# Patient Record
Sex: MALE
[De-identification: names, ages, dates, MRNs, and addresses within clinical notes are randomized per-mention and may not be internally consistent; named-entity substitution may affect disease eponyms.]

---

## 2022-11-11 PROBLEM — Z00.00 ENCOUNTER FOR PREVENTIVE HEALTH EXAMINATION: Status: ACTIVE | Noted: 2022-11-11

## 2022-11-23 ENCOUNTER — APPOINTMENT (OUTPATIENT)
Dept: UROLOGY | Facility: CLINIC | Age: 32
End: 2022-11-23

## 2022-11-23 VITALS
WEIGHT: 180 LBS | HEIGHT: 67 IN | BODY MASS INDEX: 28.25 KG/M2 | TEMPERATURE: 99 F | SYSTOLIC BLOOD PRESSURE: 123 MMHG | DIASTOLIC BLOOD PRESSURE: 80 MMHG | OXYGEN SATURATION: 96 % | HEART RATE: 93 BPM | RESPIRATION RATE: 16 BRPM

## 2022-11-23 DIAGNOSIS — Z78.9 OTHER SPECIFIED HEALTH STATUS: ICD-10-CM

## 2022-11-23 DIAGNOSIS — F17.200 NICOTINE DEPENDENCE, UNSPECIFIED, UNCOMPLICATED: ICD-10-CM

## 2022-11-23 DIAGNOSIS — N46.9 MALE INFERTILITY, UNSPECIFIED: ICD-10-CM

## 2022-11-23 DIAGNOSIS — D75.A GLUCOSE-6-PHOSPHATE DEHYDROGENASE: ICD-10-CM

## 2022-11-23 PROCEDURE — 99203 OFFICE O/P NEW LOW 30 MIN: CPT

## 2022-11-23 RX ORDER — MULTIVITAMIN
TABLET ORAL
Refills: 0 | Status: ACTIVE | COMMUNITY

## 2022-11-23 NOTE — ASSESSMENT
[FreeTextEntry1] : Up to 15% of couples may not be able to conceive after one year of unprotected intercourse. Some of the causes of infertility are reversible, some are treatable and other causes are idiopathic. In up to 25% evaluation of both partners is normal and therefore infertility is unexplained. Goals of evaluation are to identify potentially correctable causes, irreversible conditions that are amenable to assisted reproductive treatments (ART), donor insemination or adoption, genetic abnormalities and potentially comorbid conditions. \par Evaluation should include one or more semen analyses. Hormonal evaluation (such as LH, FSH, Prolactin and Testosterone) should be obtained for decreased libido, ED, abnormal semen parameters, or abnormal physical/genital exam. Scrotal or trans-rectal US is generally not required for initial evaluation. Cystic fibrosis mutation should be evaluated in men with congenital bilateral absence of the vas deferens (CBAVD). Renal US may be considered due to unilateral renal agenesis in cystic fibrosis. Y chromosome microdeletion and karyotype analyses can be considered with very low sperm concentration, hormonal abnormalities and testicular atrophy. \par One of the most common causes of male infertility is varicoceles. Varicocele ligation is recommended for palpable varicoceles and abnormal semen parameters (except for azoospermia).  Testosterone replacement therapy should not be initiated in men interested in fertility. Other options such as estrogen modulators (Clomiphene) and aromatase inhibitors (Anastrozole) can be used. The benefits of supplements such as antioxidants and multi-vitamins are questionable and data to support them is inadequate.  His examination is unremarkable.  Therefore he will repeat semen analysis.  Also hormonal study will be checked.  We will discuss the results on the phone and the next step.

## 2022-11-23 NOTE — HISTORY OF PRESENT ILLNESS
[FreeTextEntry1] : He is a 31-year-old man who is seen today for initial visit.  He has been having difficulty conceiving.  He and his wife have been sexually active for the last year without using any protection.  They have been trying to timed around her ovulation.  She is 27 years old and her work-up has been normal.  There is no history of fertility related issues in their families.  He does not complain of urinary symptoms or erectile dysfunction.  He used to drink and smoke cigarettes which she has stopped.  He works as an .  Semen analysis in November 2022 showed volume 6.5 mL, concentration 2 million, motility 25%, and morphology less than 2%.  He also wants to check for STDs.

## 2022-11-23 NOTE — PHYSICAL EXAM
[General Appearance - Well Developed] : well developed [General Appearance - Well Nourished] : well nourished [Normal Appearance] : normal appearance [Well Groomed] : well groomed [General Appearance - In No Acute Distress] : no acute distress [Edema] : no peripheral edema [Respiration, Rhythm And Depth] : normal respiratory rhythm and effort [Exaggerated Use Of Accessory Muscles For Inspiration] : no accessory muscle use [Abdomen Soft] : soft [Abdomen Tenderness] : non-tender [Costovertebral Angle Tenderness] : no ~M costovertebral angle tenderness [Urethral Meatus] : meatus normal [Penis Abnormality] : normal circumcised penis [Urinary Bladder Findings] : the bladder was normal on palpation [Scrotum] : the scrotum was normal [Testes Tenderness] : no tenderness of the testes [Testes Mass (___cm)] : there were no testicular masses [FreeTextEntry1] : No varicoceles on standing position [Normal Station and Gait] : the gait and station were normal for the patient's age [] : no rash [No Focal Deficits] : no focal deficits [Oriented To Time, Place, And Person] : oriented to person, place, and time [Affect] : the affect was normal [Mood] : the mood was normal [Not Anxious] : not anxious [Inguinal Lymph Nodes Enlarged Bilaterally] : inguinal

## 2022-11-25 LAB
APPEARANCE: CLEAR
BACTERIA: NEGATIVE
BILIRUBIN URINE: NEGATIVE
BLOOD URINE: NEGATIVE
C TRACH RRNA SPEC QL NAA+PROBE: NOT DETECTED
COLOR: NORMAL
FSH SERPL-MCNC: 4.1 IU/L
GLUCOSE QUALITATIVE U: NEGATIVE
HSV 1+2 IGG SER IA-IMP: NEGATIVE
HSV 1+2 IGG SER IA-IMP: NEGATIVE
HSV1 IGG SER QL: 0.29 INDEX
HSV2 IGG SER QL: 0.47 INDEX
HYALINE CASTS: 0 /LPF
KETONES URINE: NEGATIVE
LEUKOCYTE ESTERASE URINE: NEGATIVE
LH SERPL-ACNC: 3.2 IU/L
MICROSCOPIC-UA: NORMAL
N GONORRHOEA RRNA SPEC QL NAA+PROBE: NOT DETECTED
NITRITE URINE: NEGATIVE
PH URINE: 8
PROLACTIN SERPL-MCNC: 13.2 NG/ML
PROTEIN URINE: NEGATIVE
RED BLOOD CELLS URINE: 3 /HPF
SOURCE AMPLIFICATION: NORMAL
SPECIFIC GRAVITY URINE: 1.01
SQUAMOUS EPITHELIAL CELLS: 0 /HPF
T PALLIDUM AB SER QL IA: NEGATIVE
UROBILINOGEN URINE: NORMAL
WHITE BLOOD CELLS URINE: 0 /HPF

## 2022-11-27 LAB
HIV1+2 AB SPEC QL IA.RAPID: NONREACTIVE
TESTOST SERPL-MCNC: 254 NG/DL

## 2022-12-01 LAB
HSV1 IGM SER QL: NEGATIVE
HSV2 AB FLD-ACNC: NEGATIVE

## 2022-12-13 ENCOUNTER — APPOINTMENT (OUTPATIENT)
Dept: UROLOGY | Facility: CLINIC | Age: 32
End: 2022-12-13

## 2022-12-15 ENCOUNTER — NON-APPOINTMENT (OUTPATIENT)
Age: 32
End: 2022-12-15

## 2024-09-18 ENCOUNTER — APPOINTMENT (OUTPATIENT)
Dept: HUMAN REPRODUCTION | Facility: CLINIC | Age: 34
End: 2024-09-18